# Patient Record
Sex: FEMALE | Race: WHITE | HISPANIC OR LATINO | URBAN - METROPOLITAN AREA
[De-identification: names, ages, dates, MRNs, and addresses within clinical notes are randomized per-mention and may not be internally consistent; named-entity substitution may affect disease eponyms.]

---

## 2018-03-05 ENCOUNTER — LAB VISIT (OUTPATIENT)
Dept: LAB | Facility: HOSPITAL | Age: 53
End: 2018-03-05
Attending: INTERNAL MEDICINE
Payer: COMMERCIAL

## 2018-03-05 ENCOUNTER — OFFICE VISIT (OUTPATIENT)
Dept: INTERNAL MEDICINE | Facility: CLINIC | Age: 53
End: 2018-03-05
Payer: COMMERCIAL

## 2018-03-05 VITALS
SYSTOLIC BLOOD PRESSURE: 120 MMHG | BODY MASS INDEX: 29.3 KG/M2 | HEIGHT: 63 IN | HEART RATE: 68 BPM | DIASTOLIC BLOOD PRESSURE: 80 MMHG | OXYGEN SATURATION: 98 % | WEIGHT: 165.38 LBS

## 2018-03-05 DIAGNOSIS — K59.03 DRUG-INDUCED CONSTIPATION: ICD-10-CM

## 2018-03-05 DIAGNOSIS — M15.9 PRIMARY OSTEOARTHRITIS INVOLVING MULTIPLE JOINTS: ICD-10-CM

## 2018-03-05 DIAGNOSIS — D50.0 IRON DEFICIENCY ANEMIA DUE TO CHRONIC BLOOD LOSS: ICD-10-CM

## 2018-03-05 DIAGNOSIS — M17.0 PRIMARY OSTEOARTHRITIS OF BOTH KNEES: ICD-10-CM

## 2018-03-05 DIAGNOSIS — E78.1 HYPERTRIGLYCERIDEMIA: Primary | ICD-10-CM

## 2018-03-05 DIAGNOSIS — M51.34 DDD (DEGENERATIVE DISC DISEASE), THORACIC: ICD-10-CM

## 2018-03-05 DIAGNOSIS — E78.1 HYPERTRIGLYCERIDEMIA: ICD-10-CM

## 2018-03-05 LAB
ALBUMIN SERPL BCP-MCNC: 3.6 G/DL
ALP SERPL-CCNC: 83 U/L
ALT SERPL W/O P-5'-P-CCNC: 18 U/L
ANION GAP SERPL CALC-SCNC: 9 MMOL/L
AST SERPL-CCNC: 21 U/L
BASOPHILS # BLD AUTO: 0.06 K/UL
BASOPHILS NFR BLD: 0.5 %
BILIRUB SERPL-MCNC: 0.4 MG/DL
BUN SERPL-MCNC: 9 MG/DL
CALCIUM SERPL-MCNC: 9.3 MG/DL
CHLORIDE SERPL-SCNC: 108 MMOL/L
CHOLEST SERPL-MCNC: 193 MG/DL
CHOLEST/HDLC SERPL: 4.4 {RATIO}
CO2 SERPL-SCNC: 22 MMOL/L
CREAT SERPL-MCNC: 0.8 MG/DL
CRP SERPL-MCNC: 3.7 MG/L
DIFFERENTIAL METHOD: ABNORMAL
EOSINOPHIL # BLD AUTO: 0.4 K/UL
EOSINOPHIL NFR BLD: 3.2 %
ERYTHROCYTE [DISTWIDTH] IN BLOOD BY AUTOMATED COUNT: 14.6 %
ERYTHROCYTE [SEDIMENTATION RATE] IN BLOOD BY WESTERGREN METHOD: 50 MM/HR
EST. GFR  (AFRICAN AMERICAN): >60 ML/MIN/1.73 M^2
EST. GFR  (NON AFRICAN AMERICAN): >60 ML/MIN/1.73 M^2
FERRITIN SERPL-MCNC: 25 NG/ML
GLUCOSE SERPL-MCNC: 79 MG/DL
HCT VFR BLD AUTO: 34.6 %
HDLC SERPL-MCNC: 44 MG/DL
HDLC SERPL: 22.8 %
HGB BLD-MCNC: 11.3 G/DL
IMM GRANULOCYTES # BLD AUTO: 0.04 K/UL
IMM GRANULOCYTES NFR BLD AUTO: 0.4 %
IRON SERPL-MCNC: 78 UG/DL
LDLC SERPL CALC-MCNC: ABNORMAL MG/DL
LYMPHOCYTES # BLD AUTO: 3.5 K/UL
LYMPHOCYTES NFR BLD: 30.7 %
MCH RBC QN AUTO: 28.3 PG
MCHC RBC AUTO-ENTMCNC: 32.7 G/DL
MCV RBC AUTO: 87 FL
MONOCYTES # BLD AUTO: 1 K/UL
MONOCYTES NFR BLD: 8.5 %
NEUTROPHILS # BLD AUTO: 6.4 K/UL
NEUTROPHILS NFR BLD: 56.7 %
NONHDLC SERPL-MCNC: 149 MG/DL
NRBC BLD-RTO: 0 /100 WBC
PLATELET # BLD AUTO: 308 K/UL
PMV BLD AUTO: 11.2 FL
POTASSIUM SERPL-SCNC: 4.5 MMOL/L
PROT SERPL-MCNC: 7.1 G/DL
RBC # BLD AUTO: 3.99 M/UL
SATURATED IRON: 17 %
SODIUM SERPL-SCNC: 139 MMOL/L
TOTAL IRON BINDING CAPACITY: 454 UG/DL
TRANSFERRIN SERPL-MCNC: 307 MG/DL
TRIGL SERPL-MCNC: 503 MG/DL
WBC # BLD AUTO: 11.36 K/UL

## 2018-03-05 PROCEDURE — 80061 LIPID PANEL: CPT

## 2018-03-05 PROCEDURE — 86140 C-REACTIVE PROTEIN: CPT

## 2018-03-05 PROCEDURE — 85651 RBC SED RATE NONAUTOMATED: CPT

## 2018-03-05 PROCEDURE — 82728 ASSAY OF FERRITIN: CPT

## 2018-03-05 PROCEDURE — 99999 PR PBB SHADOW E&M-NEW PATIENT-LVL III: CPT | Mod: PBBFAC,,, | Performed by: INTERNAL MEDICINE

## 2018-03-05 PROCEDURE — 85025 COMPLETE CBC W/AUTO DIFF WBC: CPT

## 2018-03-05 PROCEDURE — 36415 COLL VENOUS BLD VENIPUNCTURE: CPT | Mod: PO

## 2018-03-05 PROCEDURE — 80053 COMPREHEN METABOLIC PANEL: CPT

## 2018-03-05 PROCEDURE — 99204 OFFICE O/P NEW MOD 45 MIN: CPT | Mod: S$GLB,,, | Performed by: INTERNAL MEDICINE

## 2018-03-05 PROCEDURE — 83540 ASSAY OF IRON: CPT

## 2018-03-05 RX ORDER — FENOFIBRATE 160 MG/1
160 TABLET ORAL DAILY
COMMUNITY

## 2018-03-05 RX ORDER — FERROUS SULFATE 325(65) MG
325 TABLET, DELAYED RELEASE (ENTERIC COATED) ORAL DAILY
COMMUNITY

## 2018-03-05 NOTE — PATIENT INSTRUCTIONS
Recomendaciones para hoy    Por favor revise la información en casa con respecto al nombre del médico que ordenó rosas colonoscopía. Vani vez que tenga esta información, comuníquese con nuestra clínica. Necesitamos recuperar los registros de rosas colonoscopia.    Le recomendamos que inicie vani terapia física para ayudar con el dolor de espalda crónico. Si los síntomas de la espalda no mejoran con la terapia física contacte a la clínica para que podamos solicitar vani referencia a un especialista de espalda.    Aumente la cantidad de fibra en la dieta para ayudar a prevenir el estreñimiento.      Recommendations for today    Please review information at home regarding the name of the doctor that ordered your colonoscopy.  Once you have this information please contact our clinic.  We need to retrieve records of your colonoscopy.    We recommend that you start physical therapy to help with chronic back pain.  If back symptoms fail to improve with physical therapy contact the clinic so that we can request a referral to a back specialist.    Increase the amount of fiber in the diet to help prevent constipation.

## 2018-03-05 NOTE — PROGRESS NOTES
Portions of this note are generated with voice recognition software. Typographical errors may exist.     SUBJECTIVE:    This is a/an 52 y.o. female here for primary care visit for  Chief Complaint   Patient presents with    Establish Care     Patient has received routine primary medical care services in [facility].  Since moving to the [place] area several months ago she has been without primary medical care services.    Patient has been diagnosed with hypertriglyceridemia.  This was a new diagnosis and its unclear if the diagnosis was correct.  Patient comes in with triglyceride levels above 800.  Patient asymptomatic.  She believes that the specimen was fasting.  She has never had pancreatitis.  She was started on fenofibrate and tolerated the medication but has run out of the medicine for the past 3 months.  She is fasting today.  Previously she was on simvastatin.  Prescribed for primary prevention.    Pap test was completed December 2017.  Prior to that patient was getting annual pelvic examinations with dialysis gynecologist.  No previous Pap abnormality is that the patient can recall.  She may have had a uterine ultrasound showing fibroid uterus.    Colonoscopy was completed for the evaluation of persistent left upper quadrant abdominal pain.  This also was completed late 2017.  Patient does not have the name of the gastroenterologist that did the procedure.  She was reassured that the colonoscopy did not indicate any urgent findings.  She was treated with ranitidine for occasional left upper quadrant discomfort.    Patient is up-to-date on mammograms.  Previous mammograms have been up-to-date and normal.    Patient has a history of chronic menorrhagia.  Taking iron supplement once daily with associated episodic painful hemorrhoids.  Self-care measures have included fiber supplementation    Medications Reviewed and Updated    Past medical, family, and social histories were reviewed and updated.    Review  of Systems negative unless otherwise noted in history of present illness-  ROS    General ROS: negative  Psychological ROS: negative  ENT ROS: negative  Endocrine ROS: Negative  Allergy and Immunology ROS: negative  Cardiovascular ROS: negative  Pulmonary ROS: Negative  Gastrointestinal ROS: negative  Genito-Urinary ROS: negative  Musculoskeletal ROS: negative  Neurological ROS: negative  Dermatological ROS: negative        Allergic:  Review of patient's allergies indicates:  No Known Allergies    OBJECTIVE:  BP: 120/80 Pulse: 68    Wt Readings from Last 3 Encounters:   03/05/18 75 kg (165 lb 5.5 oz)    Body mass index is 29.29 kg/m².  Previous Blood Pressure Readings :   BP Readings from Last 3 Encounters:   03/05/18 120/80       Physical Exam    GEN: No apparent distress  HEENT: sclera non-icteric, conjunctiva clear  CV: no peripheral edema.  Regular rate and rhythm no murmurs.  No carotid bruits.  PULM: breathing non-labored  ABD: non protuberant abdomen.  Supple.  No hepatomegaly.  PSYCH: appropriate affect  MSK: able to rise from chair without assistance.  Paraspinal tenderness lower thoracic and upper lumbar region bilaterally.  Midline pain lower thoracic.  SKIN: normal skin turgor    Pertinent Labs Reviewed       ASSESSMENT/PLAN:    Hypertriglyceridemia.Further evaluation warranted.  Recommendations as below.  -     Lipid panel; Future; Expected date: 03/05/2018  -     Comprehensive metabolic panel; Future; Expected date: 03/05/2018    Iron deficiency anemia due to chronic blood loss.Further evaluation warranted.  Recommendations as below.  -     Iron and TIBC; Future; Expected date: 03/05/2018  -     Ferritin; Future; Expected date: 03/05/2018  -     CBC auto differential; Future; Expected date: 03/05/2018    Primary osteoarthritis involving multiple joints.Further evaluation warranted.  Recommendations as below.  -     Sedimentation rate, manual; Future; Expected date: 03/05/2018  -     C-reactive protein;  Future; Expected date: 03/05/2018    DDD (degenerative disc disease), thoracic.Condition not optimally controlled. Detailed counseling on self care measures. Plan to monitor clinically in addition to plan below.   -     Ambulatory Referral to Physical/Occupational Therapy    Primary osteoarthritis of both knees.Condition not optimally controlled. Detailed counseling on self care measures. Plan to monitor clinically in addition to plan below.   -     Ambulatory Referral to Physical/Occupational Therapy    Drug-induced constipation.Condition not optimally controlled. Detailed counseling on self care measures. Plan to monitor clinically in       Future Appointments  Date Time Provider Department [place]   3/5/2018 2:15 PM [facility] [place]   4/10/2018 2:00 PM Kristopher Ford MD [facility] [place]       Kristopher Ford  3/5/2018  1:31 PM

## 2018-03-06 ENCOUNTER — TELEPHONE (OUTPATIENT)
Dept: FAMILY MEDICINE | Facility: CLINIC | Age: 53
End: 2018-03-06

## 2018-03-06 NOTE — TELEPHONE ENCOUNTER
----- Message from Jennifer Le MA sent at 3/6/2018  1:59 PM CST -----  Can you call to get information please. I have the release here at my desk. Thanks.  ----- Message -----  From: Kristopher Ford MD  Sent: 3/5/2018   2:00 PM  To: [facility] Staff    Please contact patient to confirm the name of physician that ordered colonoscopy in [facility].  Then submit release of information for colonoscopy records. Thanks

## 2018-03-07 ENCOUNTER — TELEPHONE (OUTPATIENT)
Dept: INTERNAL MEDICINE | Facility: CLINIC | Age: 53
End: 2018-03-07

## 2018-03-07 NOTE — TELEPHONE ENCOUNTER
----- Message from Jennifer Le MA sent at 3/7/2018  3:14 PM CST -----  Contact: Yudelka (daughter)/ [phone]  Please call and give them the number to psychiatry [phone] and explain that we do not use referrals for this. Thanks   ----- Message -----  From: Geeta Christensen  Sent: 3/7/2018   2:08 PM  To: [facility] Staff    Patients daughter called in to speak with you regarding a referral patient needs for the therapist.    Please call and advise.

## 2018-03-08 ENCOUNTER — TELEPHONE (OUTPATIENT)
Dept: FAMILY MEDICINE | Facility: CLINIC | Age: 53
End: 2018-03-08

## 2018-03-08 NOTE — TELEPHONE ENCOUNTER
----- Message from Jennifer Le MA sent at 3/6/2018  1:59 PM CST -----  Can you call to get information please. I have the release here at my desk. Thanks.  ----- Message -----  From: Kristopher Ford MD  Sent: 3/5/2018   2:00 PM  To: [facility] Staff    Please contact patient to confirm the name of physician that ordered colonoscopy in [facility].  Then submit release of information for colonoscopy records. Thanks

## 2018-03-09 ENCOUNTER — TELEPHONE (OUTPATIENT)
Dept: INTERNAL MEDICINE | Facility: CLINIC | Age: 53
End: 2018-03-09

## 2018-03-09 DIAGNOSIS — Z12.11 COLON CANCER SCREENING: ICD-10-CM

## 2018-03-09 NOTE — TELEPHONE ENCOUNTER
Spoke with pt's daughter who states when she called the Psych number she was told the pt would need a referral. It was explained that usually they don't need one unless the pt's insurance requires one. Yudelka says she will try again to let me know what they say. Understanding voiced.

## 2018-03-09 NOTE — TELEPHONE ENCOUNTER
----- Message from Jossie Segundo sent at 3/9/2018 10:55 AM CST -----  Contact: [phone]/pt's daughter Yudelka  Patient called in returning your call. Please advise.

## 2018-04-09 ENCOUNTER — TELEPHONE (OUTPATIENT)
Dept: INTERNAL MEDICINE | Facility: CLINIC | Age: 53
End: 2018-04-09

## 2018-04-09 NOTE — TELEPHONE ENCOUNTER
----- Message from Jennifer Le MA sent at 4/9/2018  9:51 AM CDT -----  Contact: self, [phone]  Please call and find out what it is they are wanting. I thought they wanted us to get records from [place] not us send them to [place]. If we do want us to send anything we don't have any information to where it is to go.   ----- Message -----  From: Magaly Alex  Sent: 4/9/2018   9:26 AM  To: [facility] Staff    Patient requests to know if her records were sent to new doctor she will be seeing in [place]. States she provided you with his information. Please advise.

## 2018-04-09 NOTE — TELEPHONE ENCOUNTER
Spoke with patient about getting the her new PCP in [place] to sign a release of medical record so we can faxed back her new records.  Patient verbalized understanding.

## 2018-06-15 DIAGNOSIS — Z12.39 BREAST CANCER SCREENING: ICD-10-CM

## 2019-03-15 DIAGNOSIS — Z12.11 COLON CANCER SCREENING: ICD-10-CM
